# Patient Record
(demographics unavailable — no encounter records)

---

## 2025-07-17 NOTE — REASON FOR VISIT
[Initial Evaluation] : an initial evaluation [Family Member] : family member [Patient] : patient [Parents] : parents [FreeTextEntry1] : spine evaluation

## 2025-07-17 NOTE — PHYSICAL EXAM
[FreeTextEntry1] : General: Patient is awake and alert and in no acute distress . oriented to person, place, and time. well developed, well nourished, cooperative. Skin: The skin is intact, warm, pink, and dry over the area examined.   Eyes: normal conjunctiva, normal eyelids and pupils were equal and round.   ENT: normal ears, normal nose and normal lips.   Cardiovascular: There is brisk capillary refill in the digits of the affected extremity. They are symmetric pulses in the bilateral upper and lower extremities, positive peripheral pulses, brisk capillary refill, but no peripheral edema.   Respiratory: The patient is in no apparent respiratory distress. They're taking full deep breaths without use of accessory muscles or evidence of audible wheezes or stridor without the use of a stethoscope, normal respiratory effort.   Musculoskeletal:. Examination of the back reveals shoulder asymmetry with right shoulder higher than left.  Right scapula is higher than left. Minimal flank asymmetry.  On forward bending, right thoracic and left thoracolumbar prominence noted.  Patient is able to bend forward and touch the toes as well bend backwards without pain.  Lateral flexion is symmetrical and is pain free.  Straight leg raising test is free to more than 70 degrees.   Neurological examination reveals a grade 5/5 muscle power.  Sensation is intact to crude touch and pinprick.  Deep tendon reflexes are 1+ with ankle jerk and knee jerk.  The plantars are bilaterally down going.  Superficial abdominal reflexes are symmetric and intact.  The biceps and triceps reflexes are 1+.   There is no hairy patch, lipoma, sinus in the back.  There is no pes cavus, asymmetry of calves, significant leg length discrepancy or significant cafe-au-lait spots.   Child is able to walk on tiptoes as well as heels without difficulty or pain. Child is able to jump and squat

## 2025-07-17 NOTE — ASSESSMENT
[FreeTextEntry1] : EVETTE  is a 15 year male with adolescent idiopathic scoliosis.   Today's assessment was performed with the assistance of the patient's parent as an independent historian given the patient's age. Clinical findings and x-ray results were reviewed at length with the patient and parent. We discussed at length the natural history, etiology, pathoanatomy and treatment modalities of scoliosis with patient and parent. Patient's obtained radiographs depict right thoracic curve measuring 11 degrees and a left thoracolumbar curve measuring 23 degrees. Given that the patient is 15 year old and Risser 4+, patient has completed his spinal growth and it is very unlikely for patients curve to progress. Explained to family that scoliotic curvatures under 10 degrees do not require any orthopedic intervention. Curvatures over 10 degrees are closely monitored for progression, while curvatures over 25 degrees are typically treated with a bracing regimen to prevent further progression. For curvatures with magnitude of 40 degrees or more, surgical intervention is warranted. At this time, I am recommending the patient begin attending physical therapy sessions for back and core strengthening exercises; a new prescription was provided to family today. Otherwise, we will only continue to monitor the patient's progress. Patient may continue participating in all physical activities without restrictions. We will plan to see EVETTE back in clinic in approximately 6 months for reevaluation with repeat AP and lateral scoliosis x-rays.   All questions and concerns were addressed. Patient and parent vocalized understanding and agreement to assessment and treatment plan.   Documented by Sixto Bernstein acting as a scribe for Dr. Parisi on 07/15/2025.  The above documentation completed by the scribe is an accurate record of both my words and actions.

## 2025-07-17 NOTE — END OF VISIT
[Time Spent: ___ minutes] : I have spent [unfilled] minutes of time on the encounter which excludes teaching and separately reported services. Never English

## 2025-07-17 NOTE — DATA REVIEWED
[de-identified] : My review and interpretation of the radiologic studies:     AP and lateral spine radiographs were ordered, obtained, and independently reviewed in clinic on 07/15/2025 depicting a right thoracic curve measuring 11 degrees and a left thoracolumbar curve measuring 23 degrees. Patient is Risser 4+. No obvious deformity on the lateral plane. No evidence of spondylolysis or spondylolisthesis.

## 2025-07-17 NOTE — HISTORY OF PRESENT ILLNESS
[FreeTextEntry1] : EVETTE is a 15 year-old male who presents today with his parents for initial evaluation of his spine. Parents reports that they recently noticed some asymmetry in the patient's shoulder. They then went to their pediatrician who also noticed asymmetries of the back at annual visit and advised family to follow up with an orthopedist. Otherwise, patient is doing well with no complaints or issues. Patient denies any recent fevers, chills, or night sweats. Denies any recent trauma or injuries. He denies any back pain, radiating pain, numbness, tingling sensations, discomfort, weakness to LE, radiating LE pain, or bladder/bowel dysfunction. He has been participating in all his normal physical activities without restrictions or discomfort. Patient reports significant family history of scoliosis in maternal and paternal cousins. Here today for further orthopedic evaluation.

## 2025-07-17 NOTE — DATA REVIEWED
[de-identified] : My review and interpretation of the radiologic studies:     AP and lateral spine radiographs were ordered, obtained, and independently reviewed in clinic on 07/15/2025 depicting a right thoracic curve measuring 11 degrees and a left thoracolumbar curve measuring 23 degrees. Patient is Risser 4+. No obvious deformity on the lateral plane. No evidence of spondylolysis or spondylolisthesis.